# Patient Record
Sex: MALE | Race: OTHER | HISPANIC OR LATINO | URBAN - METROPOLITAN AREA
[De-identification: names, ages, dates, MRNs, and addresses within clinical notes are randomized per-mention and may not be internally consistent; named-entity substitution may affect disease eponyms.]

---

## 2023-12-20 ENCOUNTER — NEW REFERRAL (OUTPATIENT)
Dept: URBAN - METROPOLITAN AREA CLINIC 23 | Facility: CLINIC | Age: 38
End: 2023-12-20

## 2023-12-20 DIAGNOSIS — H35.51: ICD-10-CM

## 2023-12-20 PROCEDURE — 92134 CPTRZ OPH DX IMG PST SGM RTA: CPT | Mod: NC

## 2023-12-20 PROCEDURE — 92250 FUNDUS PHOTOGRAPHY W/I&R: CPT

## 2023-12-20 PROCEDURE — 99204 OFFICE O/P NEW MOD 45 MIN: CPT

## 2023-12-20 ASSESSMENT — VISUAL ACUITY
OS_CC: 20/30-1
OD_CC: 20/50-1

## 2023-12-20 ASSESSMENT — TONOMETRY
OD_IOP_MMHG: 10
OS_IOP_MMHG: 11

## 2024-08-11 ENCOUNTER — APPOINTMENT (OUTPATIENT)
Dept: GENERAL RADIOLOGY | Age: 39
End: 2024-08-11

## 2024-08-11 ENCOUNTER — HOSPITAL ENCOUNTER (EMERGENCY)
Age: 39
Discharge: HOME | End: 2024-08-11
Attending: STUDENT IN AN ORGANIZED HEALTH CARE EDUCATION/TRAINING PROGRAM

## 2024-08-11 VITALS
TEMPERATURE: 97.4 F | DIASTOLIC BLOOD PRESSURE: 86 MMHG | OXYGEN SATURATION: 97 % | SYSTOLIC BLOOD PRESSURE: 143 MMHG | HEART RATE: 74 BPM | WEIGHT: 226.4 LBS | RESPIRATION RATE: 17 BRPM

## 2024-08-11 DIAGNOSIS — R05.2 SUBACUTE COUGH: Primary | ICD-10-CM

## 2024-08-11 PROCEDURE — 6370000000 HC RX 637 (ALT 250 FOR IP): Performed by: STUDENT IN AN ORGANIZED HEALTH CARE EDUCATION/TRAINING PROGRAM

## 2024-08-11 PROCEDURE — 71046 X-RAY EXAM CHEST 2 VIEWS: CPT

## 2024-08-11 PROCEDURE — 99283 EMERGENCY DEPT VISIT LOW MDM: CPT

## 2024-08-11 RX ORDER — PANTOPRAZOLE SODIUM 40 MG/10ML
40 INJECTION, POWDER, LYOPHILIZED, FOR SOLUTION INTRAVENOUS DAILY
COMMUNITY

## 2024-08-11 RX ORDER — BENZONATATE 100 MG/1
100 CAPSULE ORAL ONCE
Status: COMPLETED | OUTPATIENT
Start: 2024-08-11 | End: 2024-08-11

## 2024-08-11 RX ORDER — BENZONATATE 100 MG/1
100 CAPSULE ORAL 3 TIMES DAILY PRN
Qty: 21 CAPSULE | Refills: 0 | Status: SHIPPED | OUTPATIENT
Start: 2024-08-11 | End: 2024-08-18

## 2024-08-11 RX ORDER — BUPROPION HYDROCHLORIDE 75 MG/1
75 TABLET ORAL 2 TIMES DAILY
COMMUNITY

## 2024-08-11 RX ORDER — SERTRALINE HYDROCHLORIDE 100 MG/1
100 TABLET, FILM COATED ORAL DAILY
COMMUNITY

## 2024-08-11 RX ADMIN — BENZONATATE 100 MG: 100 CAPSULE ORAL at 14:06

## 2024-08-11 ASSESSMENT — PAIN - FUNCTIONAL ASSESSMENT: PAIN_FUNCTIONAL_ASSESSMENT: NONE - DENIES PAIN

## 2024-08-11 NOTE — DISCHARGE INSTRUCTIONS
Your chest xray was unremarkable, there were no signs of pneumonia.  You can use the tessalon for your cough.  You can also try over the counter cough and cold medication.  You can also try tea with honey  Call and follow-up with your family doctor in the next 1-3 days  Return to the ED if your symptoms worsen or you feel you need to be reevaluated    You can use the resources below to find a new family doctor if needed:                                                Primary Care Physicians    [facility]    Dr. Hardeep Sales MD  [facility]  [address]  [place], [place] [address]  [phone]    Rivka Rodriguez CNP  [facility]  [address]  [place], [place] [address]  [phone]    [facility]    Alisa Sales MD  [facility] [address]  [place], [place] [address]  [phone]      [facility].     Krishna aMurer MD  [address]  [place], [place] [address]  [phone]    [facility]  [address] [place] [address]  [phone]    [facility]   [address] [place] [address]  [phone]    Deepika Gamble MD  [address]  [place], [place] [address]  [phone]     Nelida Drummond MD  [address]  [place], [place] [address]  [phone]    Elin Dooley PA-C  [address] [facility]  [place], [place] [address]  [phone]    Primary Care Providers [facility]    Dr. Hieu Mejia MD  [address] [place] [address]  [phone]    Dr. Jeremiah Mckeon MD   [address] [place] [address]  [phone]    Primary Care Providers Nury Herrera MD  [address] [place], [place] [address] [phone]       [facility]    Hailee Loya MD  [address] [address]  [phone]    Chemo Maddox CNP  [facility]  [address] [id number]  [phone]       Internal Med    Mckenzie Villatoro NP  [address] [place] [address]

## 2024-08-11 NOTE — ED PROVIDER NOTES
Emergency Department Encounter        Pt Name: Zackary Pena  MRN: [id number]  Birthdate 1985  Date of evaluation: 8/11/2024  ED Physician: Rhett Hill MD    CHIEF COMPLAINT     Triage Chief Complaint:   Cough (Cough and sob, diagnosed with covid 7/25, has had these symptoms since. Taking dayquil)      HISTORY OF PRESENT ILLNESS & REVIEW OF SYSTEMS     History obtained from the patient and staff.    Zackary Pena is a 39 y.o. male who presents to the emergency department for evaluation of cough.  Says that he has had a continued cough since he was diagnosed with COVID on 725.  Says initially was getting better but over the past couple days has gotten worse.  Denies hemoptysis.  Says he initially had about 72 hours worth of fevers but they have since resolved.  Says that he would like some Tessalon Perles and x-ray.        Patient denies any new Headache, Fever, Chills, Chest pain, Shortness of breath, Abdominal pain, Nausea, Vomiting, Diarrhea, Constipation, and Leg swelling.    The patient has no other acute complaints at this time.  Review of systems as above.          PAST MED/SURG/SOCIAL/FAM HISTORY & ALLERGY & MEDICATIONS   History reviewed. No pertinent past medical history.  There is no problem list on file for this patient.    History reviewed. No pertinent family history.  No current facility-administered medications for this encounter.    Current Outpatient Medications:     sertraline (ZOLOFT) 100 MG tablet, Take 1 tablet by mouth daily, Disp: , Rfl:     buPROPion (WELLBUTRIN) 75 MG tablet, Take 1 tablet by mouth 2 times daily, Disp: , Rfl:     pantoprazole (PROTONIX) 40 MG injection, Infuse 40 mg intravenously daily, Disp: , Rfl:     benzonatate (TESSALON PERLES) 100 MG capsule, Take 1 capsule by mouth 3 times daily as needed for Cough, Disp: 21 capsule, Rfl: 0  Previous Medications    BUPROPION (WELLBUTRIN) 75 MG TABLET    Take 1 tablet by mouth 2 times daily    PANTOPRAZOLE